# Patient Record
Sex: FEMALE | Race: WHITE
[De-identification: names, ages, dates, MRNs, and addresses within clinical notes are randomized per-mention and may not be internally consistent; named-entity substitution may affect disease eponyms.]

---

## 2018-03-29 ENCOUNTER — HOSPITAL ENCOUNTER (EMERGENCY)
Dept: HOSPITAL 84 - D.ER | Age: 33
LOS: 1 days | Discharge: HOME | End: 2018-03-30
Payer: OTHER GOVERNMENT

## 2018-03-29 DIAGNOSIS — K52.9: Primary | ICD-10-CM

## 2018-03-29 LAB
ALBUMIN SERPL-MCNC: 4.4 G/DL (ref 3.4–5)
ALP SERPL-CCNC: 60 U/L (ref 46–116)
ALT SERPL-CCNC: 25 U/L (ref 10–68)
AMYLASE SERPL-CCNC: 59 U/L (ref 25–115)
ANION GAP SERPL CALC-SCNC: 16 MMOL/L (ref 8–16)
APPEARANCE UR: (no result)
BACTERIA #/AREA URNS HPF: (no result) /HPF
BASOPHILS NFR BLD AUTO: 0.2 % (ref 0–2)
BILIRUB SERPL-MCNC: 0.39 MG/DL (ref 0.2–1.3)
BILIRUB SERPL-MCNC: NEGATIVE MG/DL
BUN SERPL-MCNC: 14 MG/DL (ref 7–18)
CALCIUM SERPL-MCNC: 9.4 MG/DL (ref 8.5–10.1)
CHLORIDE SERPL-SCNC: 101 MMOL/L (ref 98–107)
CO2 SERPL-SCNC: 24.7 MMOL/L (ref 21–32)
COLOR UR: YELLOW
CREAT SERPL-MCNC: 0.9 MG/DL (ref 0.6–1.3)
EOSINOPHIL NFR BLD: 1.2 % (ref 0–7)
ERYTHROCYTE [DISTWIDTH] IN BLOOD BY AUTOMATED COUNT: 12.2 % (ref 11.5–14.5)
GLOBULIN SER-MCNC: 3.9 G/L
GLUCOSE SERPL-MCNC: 117 MG/DL (ref 74–106)
GLUCOSE SERPL-MCNC: NEGATIVE MG/DL
HCG UR QL: NEGATIVE
HCT VFR BLD CALC: 40.9 % (ref 36–48)
HGB BLD-MCNC: 14.3 G/DL (ref 12–16)
IMM GRANULOCYTES NFR BLD: 0.2 % (ref 0–5)
KETONES UR STRIP-MCNC: (no result) MG/DL
LIPASE SERPL-CCNC: 95 U/L (ref 73–393)
LYMPHOCYTES NFR BLD AUTO: 19.7 % (ref 15–50)
MCH RBC QN AUTO: 31 PG (ref 26–34)
MCHC RBC AUTO-ENTMCNC: 35 G/DL (ref 31–37)
MCV RBC: 88.7 FL (ref 80–100)
MONOCYTES NFR BLD: 5.8 % (ref 2–11)
NEUTROPHILS NFR BLD AUTO: 72.9 % (ref 40–80)
NITRITE UR-MCNC: NEGATIVE MG/ML
OSMOLALITY SERPL CALC.SUM OF ELEC: 277 MOSM/KG (ref 275–300)
PH UR STRIP: 6 [PH] (ref 5–6)
PLATELET # BLD: 272 10X3/UL (ref 130–400)
PMV BLD AUTO: 11.3 FL (ref 7.4–10.4)
POTASSIUM SERPL-SCNC: 3.7 MMOL/L (ref 3.5–5.1)
PROT SERPL-MCNC: 8.3 G/DL (ref 6.4–8.2)
PROT UR-MCNC: NEGATIVE MG/DL
RBC # BLD AUTO: 4.61 10X6/UL (ref 4–5.4)
RBC #/AREA URNS HPF: (no result) /HPF (ref 0–5)
SODIUM SERPL-SCNC: 138 MMOL/L (ref 136–145)
SP GR UR STRIP: 1.01 (ref 1–1.02)
SQUAMOUS #/AREA URNS HPF: (no result) /HPF (ref 0–5)
UROBILINOGEN UR-MCNC: NORMAL MG/DL
WBC # BLD AUTO: 12.5 10X3/UL (ref 4.8–10.8)
WBC #/AREA URNS HPF: (no result) /HPF (ref 0–5)

## 2019-12-20 LAB
ANION GAP SERPL CALC-SCNC: 12.5 MMOL/L (ref 8–16)
BASOPHILS NFR BLD AUTO: 0.4 % (ref 0–2)
BUN SERPL-MCNC: 10 MG/DL (ref 7–18)
CALCIUM SERPL-MCNC: 9.1 MG/DL (ref 8.5–10.1)
CHLORIDE SERPL-SCNC: 103 MMOL/L (ref 98–107)
CO2 SERPL-SCNC: 28.3 MMOL/L (ref 21–32)
CREAT SERPL-MCNC: 0.9 MG/DL (ref 0.6–1.3)
EOSINOPHIL NFR BLD: 3.8 % (ref 0–7)
ERYTHROCYTE [DISTWIDTH] IN BLOOD BY AUTOMATED COUNT: 12.1 % (ref 11.5–14.5)
GLUCOSE SERPL-MCNC: 82 MG/DL (ref 74–106)
HCT VFR BLD CALC: 38.4 % (ref 36–48)
HGB BLD-MCNC: 13.2 G/DL (ref 12–16)
IMM GRANULOCYTES NFR BLD: 0.1 % (ref 0–5)
LYMPHOCYTES NFR BLD AUTO: 28.9 % (ref 15–50)
MCH RBC QN AUTO: 31 PG (ref 26–34)
MCHC RBC AUTO-ENTMCNC: 34.4 G/DL (ref 31–37)
MCV RBC: 90.1 FL (ref 80–100)
MONOCYTES NFR BLD: 6.3 % (ref 2–11)
NEUTROPHILS NFR BLD AUTO: 60.5 % (ref 40–80)
OSMOLALITY SERPL CALC.SUM OF ELEC: 276 MOSM/KG (ref 275–300)
PLATELET # BLD: 307 10X3/UL (ref 130–400)
PMV BLD AUTO: 10 FL (ref 7.4–10.4)
POTASSIUM SERPL-SCNC: 3.8 MMOL/L (ref 3.5–5.1)
RBC # BLD AUTO: 4.26 10X6/UL (ref 4–5.4)
SODIUM SERPL-SCNC: 140 MMOL/L (ref 136–145)
WBC # BLD AUTO: 7.2 10X3/UL (ref 4.8–10.8)

## 2019-12-23 ENCOUNTER — HOSPITAL ENCOUNTER (OUTPATIENT)
Dept: HOSPITAL 84 - D.OPS | Age: 34
Discharge: HOME | End: 2019-12-23
Attending: OBSTETRICS & GYNECOLOGY
Payer: OTHER GOVERNMENT

## 2019-12-23 VITALS
HEIGHT: 67 IN | WEIGHT: 218 LBS | BODY MASS INDEX: 34.21 KG/M2 | BODY MASS INDEX: 34.21 KG/M2 | HEIGHT: 67 IN | WEIGHT: 218 LBS

## 2019-12-23 VITALS — SYSTOLIC BLOOD PRESSURE: 118 MMHG | DIASTOLIC BLOOD PRESSURE: 84 MMHG

## 2019-12-23 VITALS — DIASTOLIC BLOOD PRESSURE: 73 MMHG | SYSTOLIC BLOOD PRESSURE: 118 MMHG

## 2019-12-23 DIAGNOSIS — Z80.3: ICD-10-CM

## 2019-12-23 DIAGNOSIS — N87.9: Primary | ICD-10-CM

## 2019-12-23 DIAGNOSIS — Z87.42: ICD-10-CM

## 2019-12-23 LAB — HCG UR QL: NEGATIVE

## 2019-12-23 NOTE — NUR
DISCHARGE INSTRUCTIONS GIVEN TO PT. PT VERBALIZES UNDERSTANDING OF ALL
INSTRUCTIONS. COPIES GIVEN TO PT. PIV DC'D WITH CATHELON INTACT. PRESSURE
BANDAGE TO SITE. PT YANNICK WELL. PT UP TO DRESS.

## 2019-12-23 NOTE — NUR
PT PROVIDED WITH SPRITE AND CRACKERS PER REQUEST. TOLERATING WELL WITH THIS RN
IN ROOM. WILL REEVALUATE.

## 2019-12-23 NOTE — NUR
KIRBY CATH D/C'D PER ORDER. NO BLEEDING NOTED TO PERIPAD. PT STATES SHE
ALREADY FEELS URGE TO VOID. PT UP TO BR WITH MINIMAL ASSIST, VOIDS SMALL TO
MOD AMOUNT CLEAR YELLOW URINE MIXED WITH SCANT BRIGHT RED VAGINAL BLEEDING. PT
REASSURED, INSTRUCTED ON WHAT TO REPORT. UNDERSTANDING VERBALIZED. PT PLACED
IN PERIPADS AND DISPOSABLE PANTIES. AMBULATES BACK TO BED WITHOUT ASSIST.
SRUx2, CL IN REACH. SIG OTHER AT BEDSIDE. WILL RETURN.

## 2020-01-10 NOTE — OP
PATIENT NAME:  LETICIA JANSEN                          MEDICAL RECORD: [id number]
:[birth year]                                             LOCATION:D.OPS          
                                                         ADMISSION DATE:        
SURGEON:  GENEVA SANFORD MD          
 
 
DATE OF OPERATION:  2019
 
PREOPERATIVE DIAGNOSES:
1.  History of abnormal Pap smear.
2.  Cervical dysplasia.
 
POSTOPERATIVE DIAGNOSES:
1.  History of abnormal Pap smear.
2.  Cervical dysplasia.
 
PROCEDURE PERFORMED:
1.  Diagnostic laparoscopy.
2.  Total laparoscopic hysterectomy with bilateral salpingectomy.
 
SURGEON:  Geneva Sanford MD
 
ANESTHESIOLOGIST:  Dr. Burkett.
 
ANESTHETIC:  General.
 
FINDINGS:  Uterus, tubes, and ovaries were unremarkable.  What was visualized of
the abdominal anatomy is also unremarkable.
 
SPECIMEN REMOVED:  Uterus with cervix and bilateral tubes.
 
SPECIMEN DISPOSITION:  All specimens to pathology.
 
ESTIMATED BLOOD LOSS:  Minimal.
 
FLUIDS:  700 lactated Ringer's.
 
URINE OUTPUT:  100 cc of clear urine.
 
COMPLICATIONS:  None.
 
DRAIN:  Martinez to gravity discontinued in the PACU.
 
INDICATIONS:  The patient is a 34-year-old female with undesired fertility and
recurrent abnormal Pap smears.  The patient has had diagnosis of cervical
dysplasia and wishes definitive therapy.
 
DESCRIPTION OF PROCEDURE:  After informed consent was assured, the patient was
taken to the operating room, anesthetic was obtained without difficulty.  The
patient is now prepped and draped in the usual sterile fashion [facility].  Uterine manipulator was placed and then attention
was directed to the abdomen where an incision was made at the umbilicus to
accommodate a 5-mm trocar.  The trocar was inserted and pneumoperitoneum
developed.  With the patient in steep Trendelenburg position, the bowel was free
of the pelvis.  Accessory ports were placed in the right and left lower
quadrants.  The right lower quadrant port is 10mm port.  With the right tube
elevated using a coagulation cutter, the tube was removed from its attachment to
the adnexa.  The Thunderbeat is utilized to incise the uteroovarian and round
 
 
 
OPERATIVE REPORT                               [id number]    LETICIA JANSEN        
 
 
ligaments.  The anterior leaf of the broad ligament was opened and the bladder
flap developed to the midline.  This was repeated on the contralateral side, but
elevating the left tube and dissection continuing as previously described. 
Vessels are skeletonized on the left side.  They were compressed, coagulated,
and .  This is also performed on the right hand side occluding blood
flow to the uterus.  Using the Thunderbeat coagulation cutter, the uterus was
removed from its attachments to the superior aspect of the vagina from a 12 to 6
o'clock position, moving in a counterclockwise fashion then from 12-6 in a
clockwise fashion.  Uterus is now pulled into the vagina with the tubes and
pneumoperitoneum was maintained.  The pelvis was irrigated and irrigant removed.
 Using an EndoStitch, the vaginal cuff was now closed with interrupted stitches
using extracorporeal knot tie.  Pelvis again was irrigated, irrigant removed. 
All bleeding vessels are inspected and all of the operative sites inspected and
found to be hemostatic.  Sponge, lap, and needle counts correct times 2.  The
pneumoperitoneum was released.  The accessory ports were removed followed by the
primary report.  All sites were closed with a subcuticular stitch and Dermabond.
 
TRANSINT:[id number] Voice Confirmation ID: [id number] DOCUMENT ID: [id number]
                                           
                                           GENEVA SANFORD MD          
 
 
 
Electronically Signed by GENEVA SANFORD on 01/10/20 at 0738
 
 
 
 
 
 
 
 
 
 
 
 
 
 
 
 
 
 
 
 
 
 
 
CC:                                                             [id number]
DICTATION DATE: 20 1658     :     01/10/20 0257      [facility] 
                                                                      [id number] [place] [address]

## 2020-04-12 ENCOUNTER — HOSPITAL ENCOUNTER (EMERGENCY)
Dept: HOSPITAL 84 - D.ER | Age: 35
LOS: 1 days | Discharge: HOME | End: 2020-04-13
Payer: OTHER GOVERNMENT

## 2020-04-12 VITALS
HEIGHT: 67 IN | WEIGHT: 230 LBS | BODY MASS INDEX: 36.1 KG/M2 | BODY MASS INDEX: 36.1 KG/M2 | HEIGHT: 67 IN | WEIGHT: 230 LBS

## 2020-04-12 DIAGNOSIS — J02.9: ICD-10-CM

## 2020-04-12 DIAGNOSIS — R05: ICD-10-CM

## 2020-04-12 DIAGNOSIS — J45.909: Primary | ICD-10-CM

## 2020-04-13 VITALS — DIASTOLIC BLOOD PRESSURE: 71 MMHG | SYSTOLIC BLOOD PRESSURE: 125 MMHG

## 2020-04-13 LAB
ALBUMIN SERPL-MCNC: 3.7 G/DL (ref 3.4–5)
ALP SERPL-CCNC: 98 U/L (ref 30–120)
ALT SERPL-CCNC: 32 U/L (ref 10–68)
ANION GAP SERPL CALC-SCNC: 12.1 MMOL/L (ref 8–16)
APTT BLD: 30.3 SECONDS (ref 22.8–39.4)
BASOPHILS NFR BLD AUTO: 0.8 % (ref 0–2)
BILIRUB SERPL-MCNC: 0.18 MG/DL (ref 0.2–1.3)
BUN SERPL-MCNC: 20 MG/DL (ref 7–18)
CALCIUM SERPL-MCNC: 8.5 MG/DL (ref 8.5–10.1)
CHLORIDE SERPL-SCNC: 104 MMOL/L (ref 98–107)
CK MB SERPL-MCNC: 1.3 U/L (ref 0–3.6)
CK SERPL-CCNC: 124 UL (ref 21–215)
CO2 SERPL-SCNC: 25.9 MMOL/L (ref 21–32)
CREAT SERPL-MCNC: 0.9 MG/DL (ref 0.6–1.3)
EOSINOPHIL NFR BLD: 9.4 % (ref 0–7)
ERYTHROCYTE [DISTWIDTH] IN BLOOD BY AUTOMATED COUNT: 12.7 % (ref 11.5–14.5)
GLOBULIN SER-MCNC: 3.9 G/L
GLUCOSE SERPL-MCNC: 99 MG/DL (ref 74–106)
HCT VFR BLD CALC: 42.2 % (ref 36–48)
HGB BLD-MCNC: 13.8 G/DL (ref 12–16)
IMM GRANULOCYTES NFR BLD: 0.1 % (ref 0–5)
INR PPP: 0.91 (ref 0.85–1.17)
LYMPHOCYTES NFR BLD AUTO: 45.3 % (ref 15–50)
MCH RBC QN AUTO: 29.8 PG (ref 26–34)
MCHC RBC AUTO-ENTMCNC: 32.7 G/DL (ref 31–37)
MCV RBC: 91.1 FL (ref 80–100)
MONOCYTES NFR BLD: 7.1 % (ref 2–11)
NEUTROPHILS NFR BLD AUTO: 37.3 % (ref 40–80)
NT-PROBNP SERPL-MCNC: 57 PG/ML (ref 0–125)
OSMOLALITY SERPL CALC.SUM OF ELEC: 278 MOSM/KG (ref 275–300)
PLATELET # BLD: 339 10X3/UL (ref 130–400)
PMV BLD AUTO: 10.4 FL (ref 7.4–10.4)
POTASSIUM SERPL-SCNC: 4 MMOL/L (ref 3.5–5.1)
PROT SERPL-MCNC: 7.6 G/DL (ref 6.4–8.2)
PROTHROMBIN TIME: 12.2 SECONDS (ref 11.6–15)
RBC # BLD AUTO: 4.63 10X6/UL (ref 4–5.4)
SODIUM SERPL-SCNC: 138 MMOL/L (ref 136–145)
TROPONIN I SERPL-MCNC: < 0.017 NG/ML (ref 0–0.06)
WBC # BLD AUTO: 8.6 10X3/UL (ref 4.8–10.8)

## 2022-11-09 NOTE — NUR
Left message for patient at  [phone] (home) to schedule procedure. Patient to return call to [facility] [phone]. PT RCVD VIA STRETCHER TO ROOM 1273 FROM RECOVERY NURSE MUKUL NEAL. PT AAOx3,
DENIES NAUSE, C/O MILD INCISIONAL PAIN BUT DENIES NEED FOR INTERVENTION. VSS,
SEE FLOWSHEET FOR DOC. 3 LAP INCISIONS TO ABD ARE C/D WITH DERMABOND INTACT.
PT STATES SHE FEELS READY TO GO HOME WHEN SHE CAN. WILL RETURN.